# Patient Record
Sex: MALE | Race: WHITE | Employment: FULL TIME | URBAN - METROPOLITAN AREA
[De-identification: names, ages, dates, MRNs, and addresses within clinical notes are randomized per-mention and may not be internally consistent; named-entity substitution may affect disease eponyms.]

---

## 2021-03-22 ENCOUNTER — OFFICE VISIT (OUTPATIENT)
Dept: ORTHOPEDIC SURGERY | Age: 31
End: 2021-03-22
Payer: COMMERCIAL

## 2021-03-22 VITALS
TEMPERATURE: 97.5 F | HEIGHT: 70 IN | HEART RATE: 62 BPM | WEIGHT: 174 LBS | SYSTOLIC BLOOD PRESSURE: 109 MMHG | BODY MASS INDEX: 24.91 KG/M2 | DIASTOLIC BLOOD PRESSURE: 71 MMHG

## 2021-03-22 DIAGNOSIS — M25.512 ACUTE PAIN OF LEFT SHOULDER: Primary | ICD-10-CM

## 2021-03-22 DIAGNOSIS — S29.011A RUPTURE OF PECTORALIS MAJOR MUSCLE, INITIAL ENCOUNTER: ICD-10-CM

## 2021-03-22 PROCEDURE — 99203 OFFICE O/P NEW LOW 30 MIN: CPT | Performed by: ORTHOPAEDIC SURGERY

## 2021-03-22 SDOH — ECONOMIC STABILITY: TRANSPORTATION INSECURITY
IN THE PAST 12 MONTHS, HAS THE LACK OF TRANSPORTATION KEPT YOU FROM MEDICAL APPOINTMENTS OR FROM GETTING MEDICATIONS?: NOT ASKED

## 2021-03-22 SDOH — ECONOMIC STABILITY: FOOD INSECURITY: WITHIN THE PAST 12 MONTHS, THE FOOD YOU BOUGHT JUST DIDN'T LAST AND YOU DIDN'T HAVE MONEY TO GET MORE.: NOT ASKED

## 2021-03-22 ASSESSMENT — ENCOUNTER SYMPTOMS
GASTROINTESTINAL NEGATIVE: 1
EYES NEGATIVE: 1
ALLERGIC/IMMUNOLOGIC NEGATIVE: 1
RESPIRATORY NEGATIVE: 1
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
## 2021-03-23 ENCOUNTER — OFFICE VISIT (OUTPATIENT)
Dept: ORTHOPEDIC SURGERY | Age: 31
End: 2021-03-23
Payer: COMMERCIAL

## 2021-03-23 ENCOUNTER — TELEPHONE (OUTPATIENT)
Dept: ORTHOPEDIC SURGERY | Age: 31
End: 2021-03-23

## 2021-03-23 VITALS
TEMPERATURE: 97.1 F | WEIGHT: 174 LBS | HEART RATE: 56 BPM | BODY MASS INDEX: 24.91 KG/M2 | DIASTOLIC BLOOD PRESSURE: 87 MMHG | HEIGHT: 70 IN | SYSTOLIC BLOOD PRESSURE: 139 MMHG | RESPIRATION RATE: 12 BRPM

## 2021-03-23 DIAGNOSIS — M25.512 ACUTE PAIN OF LEFT SHOULDER: ICD-10-CM

## 2021-03-23 DIAGNOSIS — S29.011A RUPTURE OF PECTORALIS MAJOR MUSCLE, INITIAL ENCOUNTER: Primary | ICD-10-CM

## 2021-03-23 PROCEDURE — 99214 OFFICE O/P EST MOD 30 MIN: CPT | Performed by: ORTHOPAEDIC SURGERY

## 2021-03-23 PROCEDURE — L3670 SO ACRO/CLAV CAN WEB PRE OTS: HCPCS | Performed by: ORTHOPAEDIC SURGERY

## 2021-03-23 NOTE — TELEPHONE ENCOUNTER
CPT: 09813  BODY PART: left arm  AUTHORIZATION: NPR    Per [employer], [address] [facility]

## 2021-03-23 NOTE — PROGRESS NOTES
Evelyn Harris returns today for his left pec injury. Complains of a dull aching pain that still 1 out of 10 but it is much worse if he moves it. We obtained his MRI. Patient's medications, allergies, past medical, surgical, social and family histories were reviewed and updated as appropriate. Relevant review of systems reviewed. General Exam:    Vitals: Blood pressure 139/87, pulse 56, temperature 97.1 °F (36.2 °C), temperature source Infrared, resp. rate 12, height 5' 10\" (1.778 m), weight 174 lb (78.9 kg). Constitutional: Patient is adequately groomed with no evidence of malnutrition  Mental Status: The patient is oriented to time, place and person. The patient's mood and affect are appropriate. He has asymmetry of the left pectoralis. Is ecchymosis along the bicep. He has absence of the tendon and positive asymmetry of the nipple. Left pec MRI is reviewed. It demonstrates:  Complete tear/rupture of the distal, lateral segment of the left pectoralis major    tendon present, with detachment from the lateral ridge of the biceps groove of the humerus.     Medial tendon retraction or tear gap of 4cm present (axial T2 series 15, image 15), to near the    deltopectoral groove.       Small area of high-grade strain or muscle fiber tear in the lateral aspect of the pectoralis    major muscle (axial STIR series 17, images 12 through 18), with overlying subcutaneous soft    tissue edema.       The left pectoralis minor muscle is unremarkable.  The left deltoid muscle is unremarkable.       No evidence of fracture.       CONCLUSION:   1. Distal left pectoralis major tendons tear with a tear gap/medial retraction of 4cm. 2. High-grade strain or muscle fiber tear in the lateral aspect of the left pectoralis major    muscle. I reviewed these findings on the report and the images and personally interpreted the scan with the patient. I am in agreement with the above findings.     Assessment: Left pectoralis tendon tear. Plan: Open left pectoralis repair. We discussed the risk, benefits, and alternatives to this surgery. He would like to proceed. He understands the risk of recurrent rupture, infection, anesthetic or neurovascular compromise, or other unforeseen complications. He understands it full recovery is on the order of 6 months. He understands will be scarring and potentially some level of asymmetry. We demonstrated the pectoralis button technique to him and he is understanding of it in agreement with proceeding. We will get this scheduled for this week. Follow-up with me 3 days after surgery. Medical decision making today was moderate. Procedures    DJO ultrasling IV Shoulder Sling     Patient was prescribed a DJO Ultrasling IV Shoulder Brace. The left shoulder will require stabilization / immobilization from this orthosis. The orthosis will assist in protecting the affected area, provide functional support and facilitate healing. The device was ordered and fit on 03-23-21. The patient was educated and fit by a healthcare professional with expert knowledge and specialization in brace application while under the direct supervision of the treating physician. Verbal and written instructions for the use of and application of this item were provided. They were instructed to contact the office immediately should the brace result in increased pain, decreased sensation, increased swelling or worsening of the condition. This note was created using voice recognition software. It has been proofread, but occasionally errors remain. Please disregard these errors. They will be corrected as they are noted.

## 2021-03-24 RX ORDER — IBUPROFEN 800 MG/1
800 TABLET ORAL 3 TIMES DAILY
COMMUNITY

## 2021-03-24 RX ORDER — ACETAMINOPHEN 500 MG
1000 TABLET ORAL PRN
COMMUNITY

## 2021-03-24 RX ORDER — OMEGA-3 FATTY ACIDS CAP DELAYED RELEASE 1000 MG 1000 MG
1000 CAPSULE DELAYED RELEASE ORAL DAILY
COMMUNITY

## 2021-03-24 NOTE — PROGRESS NOTES
C-Difficile admission screening and protocol:     * Admitted with diarrhea? no   *Prior history of C-Diff. In last 3 months? no     *Antibiotic use in the past 6-8 weeks? no     *Prior hospitalization or nursing home in the last month?      no

## 2021-03-24 NOTE — PROGRESS NOTES
Preoperative Screening for Elective Surgery/Invasive Procedures While COVID-19 present in the community     Have you tested positive or have been told to self-isolate for COVID-19 like symptoms within the past 28 days? N0-TESTED POSITIVE -10/6/2021   Do you currently have any of the following symptoms? NO  o Fever >100.0 F or 99.9 F in immunocompromised patients? NO  o New onset cough, shortness of breath or difficulty breathing? NO  o New onset sore throat, myalgia (muscle aches and pains), headache, loss of taste/smell or diarrhea? NO   Have you had a potential exposure to COVID-19 within the past 14 days by:  o Close contact with a confirmed case? NO  o Close contact with a healthcare worker,  or essential infrastructure worker (grocery store, [employer], gas station, public utilities or transportation)? YES  o Do you reside in a congregate setting such as; skilled nursing facility, adult home, correctional facility, homeless shelter or other institutional setting? NO  o Have you had recent travel to a known COVID-19 hotspot? NO    Indicate if the patient has a positive screen by answering yes to one or more of the above questions. Patients who test positive or screen positive prior to surgery or on the day of surgery should be evaluated in conjunction with the surgeon/proceduralist/anesthesiologist to determine the urgency of the procedure.

## 2021-03-24 NOTE — PROGRESS NOTES
[address] [facility] time_0600___________        Surgery time_0730___________    Take the following medications with a sip of water: Follow your MD/Surgeons pre-procedure instructions regarding your medications    Do not eat or drink anything after 12:00 midnight prior to your surgery. This includes water chewing gum, mints and ice chips. You may brush your teeth and gargle the morning of your surgery, but do not swallow the water     Please see your family doctor/pediatrician for a history and physical and/or concerning medications. Bring any test results/reports from your physicians office. If you are under the care of a heart doctor or specialist doctor, please be aware that you may be asked to them for clearance    You may be asked to stop blood thinners such as Coumadin, Plavix, Fragmin, Lovenox, etc., or any anti-inflammatories such as:  Aspirin, Ibuprofen, Advil, Naproxen prior to your surgery. We also ask that you stop any OTC medications such as fish oil, vitamin E, glucosamine, garlic, Multivitamins, COQ 10, etc. May take tylenol    We ask that you do not smoke 24 hours prior to surgery  We ask that you do not  drink any alcoholic beverages 24 hours prior to surgery     You must make arrangements for a responsible adult to take you home after your surgery. For your safety you will not be allowed to leave alone or drive yourself home. Your surgery will be cancelled if you do not have a ride home. Also for your safety, it is strongly suggested that someone stay with you the first 24 hours after your surgery. A parent or legal guardian must accompany a child scheduled for surgery and plan to stay at the hospital until the child is discharged. Please do not bring other children with you. For your comfort, please wear simple loose fitting clothing to the hospital.  Please do not bring valuables.     Do not wear any make-up or nail polish on your fingers or toes      For your safety, please do not wear any jewelry or body piercing's on the day of surgery. All jewelry must be removed. If you have dentures, they will be removed before going to operating room. For your convenience, we will provide you with a container. If you wear contact lenses or glasses, they will be removed, please bring a case for them. If you have a living will and a durable power of  for healthcare, please bring in a copy. As part of our patient safety program to minimize surgical site infections, we ask you to do the following:    · Please notify your surgeon if you develop any illness between         now and the  day of your surgery. · This includes a cough, cold, fever, sore throat, nausea,         or vomiting, and diarrhea, etc.  ·  Please notify your surgeon if you experience dizziness, shortness         of breath or blurred vision between now and the time of your surgery. Do not shave your operative site 96 hours prior to surgery. For face and neck surgery, men may use an electric razor 48 hours   prior to surgery. You may shower the night before surgery or the morning of   your surgery with an antibacterial soap. You will need to bring a photo ID and insurance card    [facility] has an onsite pharmacy, would you like to utilize our pharmacy     If you will be staying overnight and use a C-pap machine, please bring   your C-pap to hospital     Our goal is to provide you with excellent care, therefore, visitors will be limited to two(2) in the room at a time so that we may focus on providing this care for you. Please contact pre-admission testing if you have any further questions.                  [facility] phone number:  [phone] PAT fax number:  [phone]  Please note these are generalized instructions for all surgical cases, you may be provided with more specific instructions according to your surgery. Remember. Vira Libman Vira Libman Safety First! Call before you Fall Remember

## 2021-03-25 ENCOUNTER — ANESTHESIA EVENT (OUTPATIENT)
Dept: OPERATING ROOM | Age: 31
End: 2021-03-25
Payer: COMMERCIAL

## 2021-03-25 DIAGNOSIS — S29.011A RUPTURE OF PECTORALIS MAJOR MUSCLE, INITIAL ENCOUNTER: Primary | ICD-10-CM

## 2021-03-25 DIAGNOSIS — M25.512 ACUTE PAIN OF LEFT SHOULDER: ICD-10-CM

## 2021-03-25 RX ORDER — OXYCODONE HYDROCHLORIDE AND ACETAMINOPHEN 5; 325 MG/1; MG/1
1 TABLET ORAL EVERY 6 HOURS PRN
Qty: 20 TABLET | Refills: 0 | Status: SHIPPED | OUTPATIENT
Start: 2021-03-26 | End: 2021-03-31

## 2021-03-25 RX ORDER — DOCUSATE SODIUM 100 MG/1
100 CAPSULE, LIQUID FILLED ORAL 2 TIMES DAILY
Qty: 60 CAPSULE | Refills: 0 | Status: SHIPPED | OUTPATIENT
Start: 2021-03-26 | End: 2021-04-05 | Stop reason: ALTCHOICE

## 2021-03-25 RX ORDER — METAXALONE 800 MG/1
800 TABLET ORAL 3 TIMES DAILY PRN
Qty: 20 TABLET | Refills: 0 | Status: SHIPPED | OUTPATIENT
Start: 2021-03-26 | End: 2021-06-14 | Stop reason: ALTCHOICE

## 2021-03-25 RX ORDER — PROMETHAZINE HYDROCHLORIDE 25 MG/1
25 TABLET ORAL EVERY 6 HOURS PRN
Qty: 30 TABLET | Refills: 0 | Status: SHIPPED | OUTPATIENT
Start: 2021-03-26 | End: 2021-04-01

## 2021-03-26 ENCOUNTER — ANESTHESIA (OUTPATIENT)
Dept: OPERATING ROOM | Age: 31
End: 2021-03-26
Payer: COMMERCIAL

## 2021-03-26 ENCOUNTER — HOSPITAL ENCOUNTER (OUTPATIENT)
Age: 31
Setting detail: OUTPATIENT SURGERY
Discharge: HOME | End: 2021-03-26
Attending: ORTHOPAEDIC SURGERY | Admitting: ORTHOPAEDIC SURGERY
Payer: COMMERCIAL

## 2021-03-26 VITALS
OXYGEN SATURATION: 98 % | DIASTOLIC BLOOD PRESSURE: 50 MMHG | SYSTOLIC BLOOD PRESSURE: 95 MMHG | TEMPERATURE: 94.8 F | RESPIRATION RATE: 7 BRPM

## 2021-03-26 VITALS
HEIGHT: 71 IN | TEMPERATURE: 97 F | BODY MASS INDEX: 24.66 KG/M2 | DIASTOLIC BLOOD PRESSURE: 83 MMHG | OXYGEN SATURATION: 94 % | RESPIRATION RATE: 16 BRPM | HEART RATE: 61 BPM | SYSTOLIC BLOOD PRESSURE: 121 MMHG | WEIGHT: 176.15 LBS

## 2021-03-26 LAB — SARS-COV-2, NAAT: NOT DETECTED

## 2021-03-26 PROCEDURE — 7100000000 HC PACU RECOVERY - FIRST 15 MIN: Performed by: ORTHOPAEDIC SURGERY

## 2021-03-26 PROCEDURE — 7100000010 HC PHASE II RECOVERY - FIRST 15 MIN: Performed by: ORTHOPAEDIC SURGERY

## 2021-03-26 PROCEDURE — 3700000000 HC ANESTHESIA ATTENDED CARE: Performed by: ORTHOPAEDIC SURGERY

## 2021-03-26 PROCEDURE — 2500000003 HC RX 250 WO HCPCS

## 2021-03-26 PROCEDURE — 87635 SARS-COV-2 COVID-19 AMP PRB: CPT

## 2021-03-26 PROCEDURE — 2500000003 HC RX 250 WO HCPCS: Performed by: ORTHOPAEDIC SURGERY

## 2021-03-26 PROCEDURE — 7100000001 HC PACU RECOVERY - ADDTL 15 MIN: Performed by: ORTHOPAEDIC SURGERY

## 2021-03-26 PROCEDURE — 2580000003 HC RX 258: Performed by: ANESTHESIOLOGY

## 2021-03-26 PROCEDURE — 2709999900 HC NON-CHARGEABLE SUPPLY: Performed by: ORTHOPAEDIC SURGERY

## 2021-03-26 PROCEDURE — 7100000011 HC PHASE II RECOVERY - ADDTL 15 MIN: Performed by: ORTHOPAEDIC SURGERY

## 2021-03-26 PROCEDURE — 3600000004 HC SURGERY LEVEL 4 BASE: Performed by: ORTHOPAEDIC SURGERY

## 2021-03-26 PROCEDURE — 3600000014 HC SURGERY LEVEL 4 ADDTL 15MIN: Performed by: ORTHOPAEDIC SURGERY

## 2021-03-26 PROCEDURE — 3700000001 HC ADD 15 MINUTES (ANESTHESIA): Performed by: ORTHOPAEDIC SURGERY

## 2021-03-26 PROCEDURE — C1713 ANCHOR/SCREW BN/BN,TIS/BN: HCPCS | Performed by: ORTHOPAEDIC SURGERY

## 2021-03-26 PROCEDURE — 6360000002 HC RX W HCPCS: Performed by: ORTHOPAEDIC SURGERY

## 2021-03-26 PROCEDURE — 6360000002 HC RX W HCPCS: Performed by: ANESTHESIOLOGY

## 2021-03-26 PROCEDURE — 64415 NJX AA&/STRD BRCH PLXS IMG: CPT | Performed by: ANESTHESIOLOGY

## 2021-03-26 PROCEDURE — 6360000002 HC RX W HCPCS

## 2021-03-26 PROCEDURE — 2580000003 HC RX 258: Performed by: ORTHOPAEDIC SURGERY

## 2021-03-26 DEVICE — KIT REP INCL 3 L PEC BTTN ATTCH INSRT FIBERTAPE SUTS W/ NDL: Type: IMPLANTABLE DEVICE | Site: ARM | Status: FUNCTIONAL

## 2021-03-26 RX ORDER — ROPIVACAINE HYDROCHLORIDE 5 MG/ML
INJECTION, SOLUTION EPIDURAL; INFILTRATION; PERINEURAL
Status: COMPLETED | OUTPATIENT
Start: 2021-03-26 | End: 2021-03-26

## 2021-03-26 RX ORDER — DEXAMETHASONE SODIUM PHOSPHATE 4 MG/ML
INJECTION, SOLUTION INTRA-ARTICULAR; INTRALESIONAL; INTRAMUSCULAR; INTRAVENOUS; SOFT TISSUE PRN
Status: DISCONTINUED | OUTPATIENT
Start: 2021-03-26 | End: 2021-03-26 | Stop reason: SDUPTHER

## 2021-03-26 RX ORDER — LIDOCAINE HYDROCHLORIDE 20 MG/ML
INJECTION, SOLUTION EPIDURAL; INFILTRATION; INTRACAUDAL; PERINEURAL PRN
Status: DISCONTINUED | OUTPATIENT
Start: 2021-03-26 | End: 2021-03-26 | Stop reason: SDUPTHER

## 2021-03-26 RX ORDER — EPHEDRINE SULFATE/0.9% NACL/PF 50 MG/5 ML
SYRINGE (ML) INTRAVENOUS PRN
Status: DISCONTINUED | OUTPATIENT
Start: 2021-03-26 | End: 2021-03-26 | Stop reason: SDUPTHER

## 2021-03-26 RX ORDER — ONDANSETRON 2 MG/ML
4 INJECTION INTRAMUSCULAR; INTRAVENOUS
Status: DISCONTINUED | OUTPATIENT
Start: 2021-03-26 | End: 2021-03-26 | Stop reason: HOSPADM

## 2021-03-26 RX ORDER — SODIUM CHLORIDE 0.9 % (FLUSH) 0.9 %
10 SYRINGE (ML) INJECTION EVERY 12 HOURS SCHEDULED
Status: DISCONTINUED | OUTPATIENT
Start: 2021-03-26 | End: 2021-03-26 | Stop reason: HOSPADM

## 2021-03-26 RX ORDER — BUPIVACAINE HYDROCHLORIDE AND EPINEPHRINE 2.5; 5 MG/ML; UG/ML
INJECTION, SOLUTION EPIDURAL; INFILTRATION; INTRACAUDAL; PERINEURAL
Status: COMPLETED | OUTPATIENT
Start: 2021-03-26 | End: 2021-03-26

## 2021-03-26 RX ORDER — FENTANYL CITRATE 50 UG/ML
INJECTION, SOLUTION INTRAMUSCULAR; INTRAVENOUS PRN
Status: DISCONTINUED | OUTPATIENT
Start: 2021-03-26 | End: 2021-03-26 | Stop reason: SDUPTHER

## 2021-03-26 RX ORDER — FENTANYL CITRATE 50 UG/ML
25 INJECTION, SOLUTION INTRAMUSCULAR; INTRAVENOUS EVERY 5 MIN PRN
Status: DISCONTINUED | OUTPATIENT
Start: 2021-03-26 | End: 2021-03-26 | Stop reason: HOSPADM

## 2021-03-26 RX ORDER — MIDAZOLAM HYDROCHLORIDE 1 MG/ML
INJECTION INTRAMUSCULAR; INTRAVENOUS PRN
Status: DISCONTINUED | OUTPATIENT
Start: 2021-03-26 | End: 2021-03-26 | Stop reason: SDUPTHER

## 2021-03-26 RX ORDER — ROCURONIUM BROMIDE 10 MG/ML
INJECTION, SOLUTION INTRAVENOUS PRN
Status: DISCONTINUED | OUTPATIENT
Start: 2021-03-26 | End: 2021-03-26 | Stop reason: SDUPTHER

## 2021-03-26 RX ORDER — SODIUM CHLORIDE 9 MG/ML
INJECTION, SOLUTION INTRAVENOUS CONTINUOUS
Status: DISCONTINUED | OUTPATIENT
Start: 2021-03-26 | End: 2021-03-26 | Stop reason: HOSPADM

## 2021-03-26 RX ORDER — OXYCODONE HYDROCHLORIDE AND ACETAMINOPHEN 5; 325 MG/1; MG/1
2 TABLET ORAL PRN
Status: DISCONTINUED | OUTPATIENT
Start: 2021-03-26 | End: 2021-03-26 | Stop reason: HOSPADM

## 2021-03-26 RX ORDER — MIDAZOLAM HYDROCHLORIDE 1 MG/ML
INJECTION INTRAMUSCULAR; INTRAVENOUS
Status: COMPLETED
Start: 2021-03-26 | End: 2021-03-26

## 2021-03-26 RX ORDER — GLYCOPYRROLATE 0.2 MG/ML
INJECTION INTRAMUSCULAR; INTRAVENOUS PRN
Status: DISCONTINUED | OUTPATIENT
Start: 2021-03-26 | End: 2021-03-26 | Stop reason: SDUPTHER

## 2021-03-26 RX ORDER — PHENYLEPHRINE HCL IN 0.9% NACL 1 MG/10 ML
SYRINGE (ML) INTRAVENOUS PRN
Status: DISCONTINUED | OUTPATIENT
Start: 2021-03-26 | End: 2021-03-26 | Stop reason: SDUPTHER

## 2021-03-26 RX ORDER — PROPOFOL 10 MG/ML
INJECTION, EMULSION INTRAVENOUS PRN
Status: DISCONTINUED | OUTPATIENT
Start: 2021-03-26 | End: 2021-03-26 | Stop reason: SDUPTHER

## 2021-03-26 RX ORDER — SODIUM CHLORIDE 0.9 % (FLUSH) 0.9 %
10 SYRINGE (ML) INJECTION PRN
Status: DISCONTINUED | OUTPATIENT
Start: 2021-03-26 | End: 2021-03-26 | Stop reason: HOSPADM

## 2021-03-26 RX ORDER — SUCCINYLCHOLINE/SOD CL,ISO/PF 200MG/10ML
SYRINGE (ML) INTRAVENOUS PRN
Status: DISCONTINUED | OUTPATIENT
Start: 2021-03-26 | End: 2021-03-26 | Stop reason: SDUPTHER

## 2021-03-26 RX ORDER — OXYCODONE HYDROCHLORIDE AND ACETAMINOPHEN 5; 325 MG/1; MG/1
1 TABLET ORAL PRN
Status: DISCONTINUED | OUTPATIENT
Start: 2021-03-26 | End: 2021-03-26 | Stop reason: HOSPADM

## 2021-03-26 RX ORDER — MAGNESIUM HYDROXIDE 1200 MG/15ML
LIQUID ORAL CONTINUOUS PRN
Status: COMPLETED | OUTPATIENT
Start: 2021-03-26 | End: 2021-03-26

## 2021-03-26 RX ORDER — DEXMEDETOMIDINE HYDROCHLORIDE 100 UG/ML
INJECTION, SOLUTION INTRAVENOUS PRN
Status: DISCONTINUED | OUTPATIENT
Start: 2021-03-26 | End: 2021-03-26 | Stop reason: SDUPTHER

## 2021-03-26 RX ADMIN — ROCURONIUM BROMIDE 10 MG: 10 INJECTION INTRAVENOUS at 07:33

## 2021-03-26 RX ADMIN — FENTANYL CITRATE 100 MCG: 50 INJECTION INTRAMUSCULAR; INTRAVENOUS at 07:31

## 2021-03-26 RX ADMIN — DEXMEDETOMIDINE HYDROCHLORIDE 4 MCG: 100 INJECTION, SOLUTION INTRAVENOUS at 08:28

## 2021-03-26 RX ADMIN — DEXMEDETOMIDINE HYDROCHLORIDE 4 MCG: 100 INJECTION, SOLUTION INTRAVENOUS at 08:24

## 2021-03-26 RX ADMIN — SODIUM CHLORIDE: 9 INJECTION, SOLUTION INTRAVENOUS at 06:43

## 2021-03-26 RX ADMIN — Medication 50 MCG: at 08:10

## 2021-03-26 RX ADMIN — SODIUM CHLORIDE: 9 INJECTION, SOLUTION INTRAVENOUS at 08:29

## 2021-03-26 RX ADMIN — PROPOFOL 300 MG: 10 INJECTION, EMULSION INTRAVENOUS at 07:33

## 2021-03-26 RX ADMIN — Medication 150 MCG: at 08:09

## 2021-03-26 RX ADMIN — MIDAZOLAM 2 MG: 1 INJECTION INTRAMUSCULAR; INTRAVENOUS at 07:13

## 2021-03-26 RX ADMIN — DEXMEDETOMIDINE HYDROCHLORIDE 4 MCG: 100 INJECTION, SOLUTION INTRAVENOUS at 08:27

## 2021-03-26 RX ADMIN — Medication 100 MCG: at 08:04

## 2021-03-26 RX ADMIN — DEXAMETHASONE SODIUM PHOSPHATE 8 MG: 4 INJECTION, SOLUTION INTRAMUSCULAR; INTRAVENOUS at 07:40

## 2021-03-26 RX ADMIN — ROPIVACAINE HYDROCHLORIDE 30 ML: 5 INJECTION, SOLUTION EPIDURAL; INFILTRATION; PERINEURAL at 07:15

## 2021-03-26 RX ADMIN — GLYCOPYRROLATE 0.1 MG: 0.2 INJECTION, SOLUTION INTRAMUSCULAR; INTRAVENOUS at 07:25

## 2021-03-26 RX ADMIN — MIDAZOLAM 1 MG: 1 INJECTION INTRAMUSCULAR; INTRAVENOUS at 07:25

## 2021-03-26 RX ADMIN — Medication 10 MG: at 08:16

## 2021-03-26 RX ADMIN — SUGAMMADEX 100 MG: 100 INJECTION, SOLUTION INTRAVENOUS at 08:26

## 2021-03-26 RX ADMIN — HYDROMORPHONE HYDROCHLORIDE 0.5 MG: 1 INJECTION, SOLUTION INTRAMUSCULAR; INTRAVENOUS; SUBCUTANEOUS at 08:37

## 2021-03-26 RX ADMIN — SODIUM CHLORIDE: 9 INJECTION, SOLUTION INTRAVENOUS at 07:06

## 2021-03-26 RX ADMIN — DEXMEDETOMIDINE HYDROCHLORIDE 4 MCG: 100 INJECTION, SOLUTION INTRAVENOUS at 08:25

## 2021-03-26 RX ADMIN — HYDROMORPHONE HYDROCHLORIDE 0.5 MG: 1 INJECTION, SOLUTION INTRAMUSCULAR; INTRAVENOUS; SUBCUTANEOUS at 08:34

## 2021-03-26 RX ADMIN — Medication 15 MG: at 08:20

## 2021-03-26 RX ADMIN — DEXMEDETOMIDINE HYDROCHLORIDE 4 MCG: 100 INJECTION, SOLUTION INTRAVENOUS at 08:26

## 2021-03-26 RX ADMIN — Medication 150 MCG: at 08:05

## 2021-03-26 RX ADMIN — SUGAMMADEX 100 MG: 100 INJECTION, SOLUTION INTRAVENOUS at 08:24

## 2021-03-26 RX ADMIN — Medication 160 MG: at 07:34

## 2021-03-26 RX ADMIN — CEFAZOLIN 2 G: 10 INJECTION, POWDER, FOR SOLUTION INTRAVENOUS at 07:25

## 2021-03-26 RX ADMIN — LIDOCAINE HYDROCHLORIDE 100 MG: 20 INJECTION, SOLUTION EPIDURAL; INFILTRATION; INTRACAUDAL; PERINEURAL at 07:33

## 2021-03-26 ASSESSMENT — PULMONARY FUNCTION TESTS
PIF_VALUE: 21
PIF_VALUE: 14
PIF_VALUE: 13
PIF_VALUE: 13
PIF_VALUE: 6
PIF_VALUE: 13
PIF_VALUE: 12
PIF_VALUE: 14
PIF_VALUE: 14
PIF_VALUE: 13
PIF_VALUE: 2
PIF_VALUE: 2
PIF_VALUE: 14
PIF_VALUE: 2
PIF_VALUE: 13
PIF_VALUE: 14
PIF_VALUE: 14
PIF_VALUE: 15
PIF_VALUE: 14
PIF_VALUE: 2
PIF_VALUE: 5
PIF_VALUE: 2
PIF_VALUE: 2
PIF_VALUE: 0
PIF_VALUE: 14
PIF_VALUE: 13
PIF_VALUE: 2
PIF_VALUE: 14
PIF_VALUE: 6
PIF_VALUE: 15
PIF_VALUE: 14
PIF_VALUE: 13
PIF_VALUE: 14

## 2021-03-26 ASSESSMENT — PAIN SCALES - GENERAL
PAINLEVEL_OUTOF10: 0
PAINLEVEL_OUTOF10: 0

## 2021-03-26 NOTE — PROGRESS NOTES
Vss, dressing and sling are clean dry intact. Fingers are warm and viviane well. Ice pack to surgery area. Tolerated sitting up and po fluids and cookies well. Mother at bedside. Verbalized understanding of discharge instructions.

## 2021-03-26 NOTE — ANESTHESIA POSTPROCEDURE EVALUATION
Department of Anesthesiology  Postprocedure Note    Patient: Sandie Anderson  MRN: [id number]  YOB: 1990  Date of evaluation: 3/26/2021  Time:  9:31 AM     Procedure Summary     Date: 03/26/21 Room / Location: [address] [place] [address] / [facility]    Anesthesia Start: 3905 Anesthesia Stop: 1382    Procedure: LEFT PECTORALIS REPAIR (Left Chest) Diagnosis: (LEFT CHEST PECTORALIS RUPTURE)    Surgeons: Rusty Prasad MD Responsible Provider: Taisha Jesus MD    Anesthesia Type: regional, general ASA Status: 2          Anesthesia Type: regional, general    Janet Phase I: Janet Score: 4    Janet Phase II:      Last vitals: Reviewed and per EMR flowsheets.        Anesthesia Post Evaluation    Patient location during evaluation: bedside  Patient participation: complete - patient participated  Level of consciousness: awake  Pain score: 0  Airway patency: patent  Nausea & Vomiting: no nausea and no vomiting  Complications: no  Cardiovascular status: blood pressure returned to baseline  Respiratory status: acceptable  Hydration status: euvolemic

## 2021-03-26 NOTE — PROGRESS NOTES
Awake. Denies nausea/pain. Tolerating ice chips. Left arm shoulder still number. Fingers pink, warm, good pulses.

## 2021-03-26 NOTE — ANESTHESIA PRE PROCEDURE
[facility] Department of Anesthesiology  Pre-Anesthesia Evaluation/Consultation       Name:  Ravi Nash  : 1990  Age:  35 y.o. MRN:  [id number]  Date: 3/26/2021           Surgeon: Surgeon(s):  Ulises Barron MD    Procedure: Procedure(s):  LEFT PECTORALIS REPAIR     No Known Allergies  There is no problem list on file for this patient. Past Medical History:   Diagnosis Date    Anesthesia complication     MOTHER'S FIRST OR SECOND COUSIN-PASSED AWAY FROM REACTION OF ANESTHESIA     Past Surgical History:   Procedure Laterality Date    KNEE ARTHROSCOPY Right 0318    PLICA EXCISION    OTHER SURGICAL HISTORY  2010    HEMANGIOMA REMOVED FROM JAWLINE & EPIDERMAL INCLUSION CYST REMOVED    WISDOM TOOTH EXTRACTION       Social History     Tobacco Use    Smoking status: Never Smoker    Smokeless tobacco: Never Used   Substance Use Topics    Alcohol use: Yes     Comment: OCCAS    Drug use: Never     Medications  No current facility-administered medications on file prior to encounter.       Current Outpatient Medications on File Prior to Encounter   Medication Sig Dispense Refill    ibuprofen (ADVIL;MOTRIN) 800 MG tablet Take 800 mg by mouth three times daily      Omega-3 Fatty Acids (FISH OIL) 1000 MG CPDR Take 1,000 mg by mouth daily      Multiple Vitamin (MULTIVITAMIN ADULT PO) Take 1 tablet by mouth daily      acetaminophen (TYLENOL) 500 MG tablet Take 1,000 mg by mouth as needed for Pain       Current Facility-Administered Medications   Medication Dose Route Frequency Provider Last Rate Last Admin    ceFAZolin (ANCEF) 2000 mg in dextrose 5 % 100 mL IVPB  2,000 mg Intravenous Once Ulises Barron MD        0.9 % sodium chloride infusion   Intravenous Continuous Juan José Aparicio MD        sodium chloride flush 0.9 % injection 10 mL  10 mL Intravenous 2 times per day Juan José Aparicio MD        sodium chloride flush 0.9 % injection 10 mL  10 mL Intravenous PRN Birdie Mcburney, MD         Vital Signs (Current)   There were no vitals filed for this visit. BP Readings from Last 3 Encounters:   03/23/21 139/87   03/22/21 109/71     Vital Signs Statistics (for past 48 hrs)     No data recorded  BP Readings from Last 3 Encounters:   03/23/21 139/87   03/22/21 109/71       BMI  There is no height or weight on file to calculate BMI. Estimated body mass index is 24.97 kg/m² as calculated from the following:    Height as of 3/23/21: 5' 10\" (1.778 m). Weight as of 3/23/21: 174 lb (78.9 kg). CBC No results found for: WBC, RBC, HGB, HCT, MCV, RDW, PLT  CMP  No results found for: NA, K, CL, CO2, BUN, CREATININE, GFRAA, AGRATIO, LABGLOM, GLUCOSE, PROT, CALCIUM, BILITOT, ALKPHOS, AST, ALT  BMP  No results found for: NA, K, CL, CO2, BUN, CREATININE, CALCIUM, GFRAA, LABGLOM, GLUCOSE  POCGlucose  No results for input(s): GLUCOSE in the last 72 hours. Coags  No results found for: PROTIME, INR, APTT  HCG (If Applicable) No results found for: PREGTESTUR, PREGSERUM, HCG, HCGQUANT   ABGs No results found for: PHART, PO2ART, MBQ6TWM, YJZ7LEJ, BEART, E2LZEQWI   Type & Screen (If Applicable)  No results found for: LABABO, LABRH                         BMI: Wt Readings from Last 3 Encounters:       NPO Status:                          Anesthesia Evaluation  Patient summary reviewed no history of anesthetic complications:   Airway: Mallampati: II        Dental:          Pulmonary:Negative Pulmonary ROS                              Cardiovascular:Negative CV ROS                      Neuro/Psych:   Negative Neuro/Psych ROS              GI/Hepatic/Renal: Neg GI/Hepatic/Renal ROS            Endo/Other: Negative Endo/Other ROS                    Abdominal:           Vascular: negative vascular ROS. Anesthesia Plan      regional and general     ASA 2       Induction: intravenous.     MIPS: Prophylactic antiemetics administered. Anesthetic plan and risks discussed with patient. Plan discussed with CRNA. Attending anesthesiologist reviewed and agrees with Pre Eval content              This pre-anesthesia assessment may be used as a history and physical.    DOS STAFF ADDENDUM:    Pt seen and examined, chart reviewed (including anesthesia, drug and allergy history). No interval changes to history and physical examination. Anesthetic plan, risks, benefits, alternatives, and personnel involved discussed with patient. Patient verbalized an understanding and agrees to proceed.       Rhett Mitchell MD  March 26, 2021  6:31 AM

## 2021-03-26 NOTE — ANESTHESIA PROCEDURE NOTES
Peripheral Block    Patient location during procedure: pre-op  Start time: 3/26/2021 7:14 AM  End time: 3/26/2021 7:14 AM  Staffing  Performed: anesthesiologist   Anesthesiologist: Vanita Reilly MD  Preanesthetic Checklist  Completed: patient identified, IV checked, site marked, risks and benefits discussed, surgical consent, monitors and equipment checked, pre-op evaluation, timeout performed, anesthesia consent given, oxygen available and patient being monitored  Peripheral Block  Patient position: supine  Prep: ChloraPrep  Patient monitoring: continuous pulse ox and IV access  Block type: Brachial plexus  Laterality: left  Injection technique: single-shot  Guidance: ultrasound guided  Interscalene  Provider prep: mask  Assessment  Injection assessment: negative aspiration for heme, no paresthesia on injection and local visualized surrounding nerve on ultrasound  Paresthesia pain: none  Slow fractionated injection: yes  Hemodynamics: stable  Additional Notes  Midazolam 2 mg administered for placement of 30 ml of 0.5% Ropivacaine between the left anterior and middle scalene muscles under dynamic ultrasound guidance with fractionated injection. No complications. No blood loss.    Medications Administered  Ropivacaine (NAROPIN) injection 0.5%, 30 mL  Reason for block: post-op pain management and at surgeon's request

## 2021-03-26 NOTE — PROGRESS NOTES
From PACU. Alert and oriented. No c/o. Vss. Dressing and sling are clean dry intact. Ice pack to left arm. Fingers are warm and viviane we. left am is numb from block.

## 2021-03-26 NOTE — DISCHARGE INSTR - DIET

## 2021-03-26 NOTE — OP NOTE
[address] [address]                                OPERATIVE REPORT    PATIENT NAME: Dhruv Olivas                      :        1990  MED REC NO:   [id number]                          ROOM:  ACCOUNT NO:   [de-identified]                           ADMIT DATE: 2021  PROVIDER:     Criss Sinha MD    DATE OF PROCEDURE:  2021    PREOPERATIVE DIAGNOSIS:  Left pectoralis rupture. POSTOPERATIVE DIAGNOSIS:  Left pectoralis rupture. OPERATION PERFORMED:  Open repair of left pectoralis rupture. SURGEON:  Criss Sinha MD    FIRST ASSISTANT:  Dr. Cristela Huggins. SECOND ASSISTANT:  Tc Das CSA. ANESTHESIA:  General plus interscalene block and local.    ANTIBIOTICS: Ancef    ESTIMATED BLOOD LOSS:  Minimal.    COMPLICATIONS:  None apparent. INDICATIONS:  The patient is 35years-old. He is an avid weightlifter  and . He ruptured his left pectoralis about a week ago while bench pressing. MRI  scan confirmed this. He had significant ecchymosis and deformity. He  was indicated for surgery. Understanding the risks, benefits, and  alternatives of the operation, he was eager to proceed    OPERATIVE PROCEDURE:  The patient was identified in the preop holding  area. Informed consent had been obtained. The operative site of the  left shoulder was marked by me with my initials. He was given a  preoperative interscalene nerve block, brought to the operating room,  placed under general anesthesia, and placed in the beach-chair position. All bony prominences were padded and the head was fully secured. SCDs were placed. Examination of the left shoulder revealed significant ecchymosis along  the biceps, absent pectoralis tendon in the axillary fold and a  dropped nipple sign. The left upper extremity was prepped and draped in  standard sterile fashion with alcohol and ChloraPrep.   A time-out  confirmed appropriate patient, positioning, operative site, and  availability of instrumentation. Antibiotics were administered. We then made an approximately 5-cm incision along the inferior aspect of  the deltopectoral groove just off the axillary fold. We dissected down  the clavipectoral fascia and immediately encountered significant  hematoma. The patient has very little subcutaneous fat. The seroma was  evacuated. We identified the pectoralis tendon, which had ruptured  completely off the humerus. The biceps tendon was normally located in  the groove. Using the Arthrex pectoralis kit, I placed whipstitches using FiberTape  suture in the inferior, central, and superior aspects of the pectoralis  with the sutures exiting superiorly, but grasping nice posterior tendon  tissue. I released the pectoralis from its adhesions inferiorly,  superiorly, anteriorly, and posteriorly, and then we decorticated the  pectoralis tendon insertion using a combination of an elevator and  motorized kayli. The three drill holes using the Arthrex drill bit were  placed in staggered fashion linearly with the central one being a bit  off that line to prevent a stress riser and these were drilled  unicortically. We then placed the buttons in tension-slide technique in the three sets  sutures and passed each button sequentially inferiorly, centrally, and  superiorly. The buttons were flipped and then using the tension-slide  technique, we nicely reduced the tendon directly back to its native  origin and tied each of the suture sequentially. The normal contour was  restored perfectly. Deep tissue was irrigated and closed the deep tissue with 0 Vicryl and  2-0 Vicryl, and the skin with a Monocryl and then Prineo dressing was  applied. The patient was then awoken and transported to recovery  without apparent complication.         Daniel De Santiago MD    D: 03/26/2021 8:38:34       T: 03/26/2021 11:46:58 [email or web address]  Job#: [id number]     Doc#: [id number]    CC:

## 2021-03-26 NOTE — BRIEF OP NOTE
Brief Postoperative Note      Patient: Evelyn Harris  YOB: 1990  MRN: [id number]    Date of Procedure: 3/26/2021    Pre-Op Diagnosis: LEFT CHEST PECTORALIS RUPTURE    Post-Op Diagnosis: Same       Procedure(s):  LEFT PECTORALIS REPAIR    Surgeon(s):  Marlena Castillo MD    Assistant:  Surgical Assistant: Carline Griffin    Anesthesia: General    Estimated Blood Loss (mL): Minimal    Complications: None    Specimens:   * No specimens in log *    Implants:  Implant Name Type Inv.  Item Serial No.  Lot No. LRB No. Used Action   KIT REP INCL 3 L PEC BTTN ATTCH INSRT FIBERTAPE SUTS W/ NDL  KIT REP INCL 3 L PEC BTTN ATTCH INSRT FIBERTAPE SUTS W/ NDL  [employer] [facility]-WD [id number] Left 1 Implanted         Drains: * No LDAs found *    Findings: Left Pec rupture    Electronically signed by Jacinta Negron MD on 3/26/2021 at 8:24 AM

## 2021-03-29 ENCOUNTER — OFFICE VISIT (OUTPATIENT)
Dept: ORTHOPEDIC SURGERY | Age: 31
End: 2021-03-29

## 2021-03-29 VITALS — BODY MASS INDEX: 24.64 KG/M2 | TEMPERATURE: 97.7 F | WEIGHT: 176 LBS | HEIGHT: 71 IN

## 2021-03-29 DIAGNOSIS — S29.011D RUPTURE OF PECTORALIS MAJOR MUSCLE, SUBSEQUENT ENCOUNTER: Primary | ICD-10-CM

## 2021-03-29 PROCEDURE — 99024 POSTOP FOLLOW-UP VISIT: CPT | Performed by: ORTHOPAEDIC SURGERY

## 2021-03-29 NOTE — PROGRESS NOTES
Edgar Linares returns today status post left pectoralis repair performed 3/26/2021. He is doing very well. Pain is appropriately managed at this point. Patient's medications, allergies, past medical, surgical, social and family histories were reviewed and updated as appropriate. Relevant review of systems reviewed. Incision over the left shoulder today is looking good. Dressing is intact. I change the bandages. Neurologic and vascular exams are normal and there is no evidence of infection. He has appropriate ecchymosis. He can remove the sling for hygiene and some light hand wrist and elbow exercises. Follow-up with me in a week for removal.                                                                                                                                                  This note was created using voice recognition software. It has been proofread, but occasionally errors remain. Please disregard these errors. They will be corrected as they are noted.

## 2021-04-05 ENCOUNTER — OFFICE VISIT (OUTPATIENT)
Dept: ORTHOPEDIC SURGERY | Age: 31
End: 2021-04-05

## 2021-04-05 VITALS — HEIGHT: 71 IN | TEMPERATURE: 97.8 F | RESPIRATION RATE: 12 BRPM | BODY MASS INDEX: 24.64 KG/M2 | WEIGHT: 176 LBS

## 2021-04-05 DIAGNOSIS — S29.011D RUPTURE OF PECTORALIS MAJOR MUSCLE, SUBSEQUENT ENCOUNTER: Primary | ICD-10-CM

## 2021-04-05 PROCEDURE — 99024 POSTOP FOLLOW-UP VISIT: CPT | Performed by: ORTHOPAEDIC SURGERY

## 2021-04-05 NOTE — PROGRESS NOTES
Trevon Wong returns today to follow-up his left pectoralis repair. He is doing well and reports minimal if any pain. Today, incision looks good without infection. I removed his dressing and placed new Tegaderm. Neurologic and vascular exams are normal. He will start therapy. He begin to activate the bicep now. Follow-up with me in 4 weeks. Patient's medications, allergies, past medical, surgical, social and family histories were reviewed and updated as appropriate. Relevant review of systems reviewed. This note was created using voice recognition software. It has been proofread, but occasionally errors remain. Please disregard these errors. They will be corrected as they are noted.

## 2021-05-03 ENCOUNTER — OFFICE VISIT (OUTPATIENT)
Dept: ORTHOPEDIC SURGERY | Age: 31
End: 2021-05-03

## 2021-05-03 VITALS
SYSTOLIC BLOOD PRESSURE: 131 MMHG | WEIGHT: 176 LBS | DIASTOLIC BLOOD PRESSURE: 78 MMHG | HEART RATE: 64 BPM | HEIGHT: 71 IN | BODY MASS INDEX: 24.64 KG/M2

## 2021-05-03 DIAGNOSIS — S29.011D RUPTURE OF PECTORALIS MAJOR MUSCLE, SUBSEQUENT ENCOUNTER: Primary | ICD-10-CM

## 2021-05-03 PROCEDURE — 99024 POSTOP FOLLOW-UP VISIT: CPT | Performed by: ORTHOPAEDIC SURGERY

## 2021-05-03 NOTE — PROGRESS NOTES
Liana Polanco returns today 6 weeks status post left pectoralis repair. He is doing really well. If he has pain is only 1 out of 10. He is pleased with therapy. Patient's medications, allergies, past medical, surgical, social and family histories were reviewed and updated as appropriate. Relevant review of systems reviewed. General Exam:    Vitals: Blood pressure 131/78, pulse 64, height 5' 11\" (1.803 m), weight 176 lb (79.8 kg). Constitutional: Patient is adequately groomed with no evidence of malnutrition  Mental Status: The patient is oriented to time, place and person. The patient's mood and affect are appropriate. Incision is healing nicely. Pec contour is normal.  He has no dropped nipple. Pec firing is appropriate. Neurologic and vascular exams left upper extremity are intact. Assessment: Doing well status post left pec repair. Plan: He can wean from his sling. We will continue with therapy with an emphasis on range of motion and isometrics. Follow-up with me in 6 weeks. This note was created using voice recognition software. It has been proofread, but occasionally errors remain. Please disregard these errors. They will be corrected as they are noted.

## 2021-06-14 ENCOUNTER — OFFICE VISIT (OUTPATIENT)
Dept: ORTHOPEDIC SURGERY | Age: 31
End: 2021-06-14

## 2021-06-14 VITALS — HEIGHT: 71 IN | BODY MASS INDEX: 24.92 KG/M2 | WEIGHT: 178 LBS

## 2021-06-14 DIAGNOSIS — S29.011D RUPTURE OF PECTORALIS MAJOR MUSCLE, SUBSEQUENT ENCOUNTER: Primary | ICD-10-CM

## 2021-06-14 PROCEDURE — 99024 POSTOP FOLLOW-UP VISIT: CPT | Performed by: ORTHOPAEDIC SURGERY

## 2021-06-14 NOTE — PROGRESS NOTES
Sharon Dowd returns today just under 3 months status post left pectoralis repair. He is doing well and reports no pain. He is progressing appropriately in therapy. I have reviewed the patient's medical history in detail and updated the computerized patient record. General Exam:    Vitals: Height 5' 11\" (1.803 m), weight 178 lb (80.7 kg). Constitutional: Patient is adequately groomed with no evidence of malnutrition  Mental Status: The patient is oriented to time, place and person. The patient's mood and affect are appropriate. Left shoulder has a well-healed incision. He has no tenderness. He has symmetric movement of the back and no tenderness with a clearly intact tendon. Neurologic and vascular exams are normal.    Assessment: Making appropriate progress status post left pectoralis repair. Plan: He will continue with therapy and follow-up with me in 3 months with anticipated full clearance at that time                                                                                                                                                  This note was created using voice recognition software. It has been proofread, but occasionally errors remain. Please disregard these errors. They will be corrected as they are noted.

## 2021-09-13 ENCOUNTER — OFFICE VISIT (OUTPATIENT)
Dept: ORTHOPEDIC SURGERY | Age: 31
End: 2021-09-13
Payer: COMMERCIAL

## 2021-09-13 VITALS
WEIGHT: 178 LBS | SYSTOLIC BLOOD PRESSURE: 152 MMHG | HEART RATE: 54 BPM | DIASTOLIC BLOOD PRESSURE: 76 MMHG | BODY MASS INDEX: 24.92 KG/M2 | HEIGHT: 71 IN

## 2021-09-13 DIAGNOSIS — S29.011D RUPTURE OF PECTORALIS MAJOR MUSCLE, SUBSEQUENT ENCOUNTER: Primary | ICD-10-CM

## 2021-09-13 PROCEDURE — 99212 OFFICE O/P EST SF 10 MIN: CPT | Performed by: ORTHOPAEDIC SURGERY

## (undated) DEVICE — INTENT TO BE USED WITH SUTURE MATERIAL FOR TISSUE CLOSURE: Brand: RICHARD-ALLAN® NEEDLE 1/2 CIRCLE TAPER

## (undated) DEVICE — DRAPE,U/SHT,SPLIT,FILM,60X84,STERILE: Brand: MEDLINE

## (undated) DEVICE — DRAPE HND W114XL142IN BLU POLYPR W O PCH FEN CRD AND TB HLDR

## (undated) DEVICE — BANDAGE ELASTIC 5YDX4IN ST COMPRSS LF NON ADH

## (undated) DEVICE — SYSTEM SKIN CLSR 22CM DERMBND PRINEO

## (undated) DEVICE — SUTURE PERMAHAND SZ 2-0 L12X18IN NONABSORBABLE BLK SILK A185H

## (undated) DEVICE — NEEDLE SUT MAYO CATGUT NO 2 TAPR PT 1/2 CIR

## (undated) DEVICE — STOCKINETTE,IMPERVIOUS,12X48,STERILE: Brand: MEDLINE

## (undated) DEVICE — SPONGE LAP W18XL18IN WHT COT 4 PLY FLD STRUNG RADPQ DISP ST

## (undated) DEVICE — STERILE LATEX POWDER-FREE SURGICAL GLOVESWITH NITRILE COATING: Brand: PROTEXIS

## (undated) DEVICE — 3M™ STERI-DRAPE™ U-DRAPE 1015: Brand: STERI-DRAPE™

## (undated) DEVICE — KIT SHLDR STBL MARCO FOR SPIDER LIMB POS

## (undated) DEVICE — STRIP,CLOSURE,WOUND,MEDI-STRIP,1/2X4: Brand: MEDLINE

## (undated) DEVICE — INTENDED TO SUPPORT AND MAINTAIN THE POSITION OF AN ANESTHETIZED PATIENT DURING SURGERY: Brand: ERIN BEACH CHAIR FACE MASK

## (undated) DEVICE — SHEET,DRAPE,53X77,STERILE: Brand: MEDLINE

## (undated) DEVICE — INTENDED FOR TISSUE SEPARATION, AND OTHER PROCEDURES THAT REQUIRE A SHARP SURGICAL BLADE TO PUNCTURE OR CUT.: Brand: BARD-PARKER ® STAINLESS STEEL BLADES

## (undated) DEVICE — 3M™ COBAN™ NL STERILE NON-LATEX SELF-ADHERENT WRAP, 2084S, 4 IN X 5 YD (10 CM X 4,5 M), 18 ROLLS/CASE: Brand: 3M™ COBAN™

## (undated) DEVICE — SUTURE VCRL SZ 0 L18IN ABSRB UD L36MM CT-1 1/2 CIR J840D

## (undated) DEVICE — STERILE POLYISOPRENE POWDER-FREE SURGICAL GLOVES: Brand: PROTEXIS

## (undated) DEVICE — MAJOR SET UP: Brand: MEDLINE INDUSTRIES, INC.

## (undated) DEVICE — PAD,NON-ADHERENT,3X8,STERILE,LF,1/PK: Brand: MEDLINE

## (undated) DEVICE — SUTURE VCRL SZ 2-0 L18IN ABSRB UD CT-1 L36MM 1/2 CIR J839D

## (undated) DEVICE — NEEDLE HYPO 23GA L1.5IN TURQ POLYPR HUB S STL THN WALL IM

## (undated) DEVICE — SOLUTION IV IRRIG POUR BRL 0.9% SODIUM CHL 2F7124

## (undated) DEVICE — GOWN SIRUS NONREIN XL W/TWL: Brand: MEDLINE INDUSTRIES, INC.

## (undated) DEVICE — SYRINGE MED 10ML LUERLOCK TIP W/O SFTY DISP

## (undated) DEVICE — Device

## (undated) DEVICE — SPONGE GZ W4XL4IN COT 12 PLY TYP VII WVN C FLD DSGN

## (undated) DEVICE — SUTURE MCRYL SZ 4-0 L18IN ABSRB UD L19MM PS-2 3/8 CIR PRIM Y496G